# Patient Record
Sex: FEMALE | Race: ASIAN | NOT HISPANIC OR LATINO | URBAN - METROPOLITAN AREA
[De-identification: names, ages, dates, MRNs, and addresses within clinical notes are randomized per-mention and may not be internally consistent; named-entity substitution may affect disease eponyms.]

---

## 2023-01-26 ENCOUNTER — EMERGENCY (EMERGENCY)
Facility: HOSPITAL | Age: 58
LOS: 1 days | Discharge: HOME | End: 2023-01-26
Attending: EMERGENCY MEDICINE
Payer: MEDICAID

## 2023-01-26 VITALS
RESPIRATION RATE: 18 BRPM | SYSTOLIC BLOOD PRESSURE: 158 MMHG | HEIGHT: 62.99 IN | DIASTOLIC BLOOD PRESSURE: 90 MMHG | WEIGHT: 116.84 LBS | HEART RATE: 100 BPM | TEMPERATURE: 98 F | OXYGEN SATURATION: 97 %

## 2023-01-26 PROCEDURE — 99284 EMERGENCY DEPT VISIT MOD MDM: CPT

## 2023-01-26 NOTE — ED ADULT NURSE NOTE - NSIMPLEMENTINTERV_GEN_ALL_ED
Implemented All Fall Risk Interventions:  [place] to call system. Call bell, personal items and telephone within reach. Instruct patient to call for assistance. Room bathroom lighting operational. Non-slip footwear when patient is off stretcher. Physically safe environment: no spills, clutter or unnecessary equipment. Stretcher in lowest position, wheels locked, appropriate side rails in place. Provide visual cue, wrist band, yellow gown, etc. Monitor gait and stability. Monitor for mental status changes and reorient to person, place, and time. Review medications for side effects contributing to fall risk. Reinforce activity limits and safety measures with patient and family.

## 2023-01-26 NOTE — ED ADULT NURSE NOTE - OBJECTIVE STATEMENT
Pt is a 58 y/o female presenting to the ED c/o hearing loss. Pt is primarily Slovak-speaking, family at bedside requesting to translate. Pt endorses acute onset of hearing loss in L ear today after waking up with associated dizziness while ambulating. No significant PMH. Pt denies falls/trauma, headache, changes in vision, chest pain, SOB, weakness.

## 2023-01-26 NOTE — ED ADULT TRIAGE NOTE - CHIEF COMPLAINT QUOTE
patient states that she has not been able to hear out of her left ear since 8 am when she woke up, states that "I think my pillow is the cause"

## 2023-01-26 NOTE — ED ADULT NURSE NOTE - CHPI ED NUR SYMPTOMS NEG
no bleeding gums/no chills/no fever/no loss of consciousness/no nausea/no numbness/no syncope/no weakness

## 2023-01-27 ENCOUNTER — APPOINTMENT (OUTPATIENT)
Dept: OTOLARYNGOLOGY | Facility: CLINIC | Age: 58
End: 2023-01-27
Payer: MEDICAID

## 2023-01-27 VITALS
DIASTOLIC BLOOD PRESSURE: 86 MMHG | OXYGEN SATURATION: 99 % | SYSTOLIC BLOOD PRESSURE: 135 MMHG | TEMPERATURE: 98 F | HEART RATE: 95 BPM | RESPIRATION RATE: 18 BRPM

## 2023-01-27 DIAGNOSIS — Z82.49 FAMILY HISTORY OF ISCHEMIC HEART DISEASE AND OTHER DISEASES OF THE CIRCULATORY SYSTEM: ICD-10-CM

## 2023-01-27 DIAGNOSIS — Z78.9 OTHER SPECIFIED HEALTH STATUS: ICD-10-CM

## 2023-01-27 PROBLEM — Z00.00 ENCOUNTER FOR PREVENTIVE HEALTH EXAMINATION: Status: ACTIVE | Noted: 2023-01-27

## 2023-01-27 LAB
ALBUMIN SERPL ELPH-MCNC: 4.3 G/DL — SIGNIFICANT CHANGE UP (ref 3.3–5)
ALP SERPL-CCNC: 54 U/L — SIGNIFICANT CHANGE UP (ref 40–120)
ALT FLD-CCNC: 15 U/L — SIGNIFICANT CHANGE UP (ref 10–45)
ANION GAP SERPL CALC-SCNC: 11 MMOL/L — SIGNIFICANT CHANGE UP (ref 5–17)
AST SERPL-CCNC: 29 U/L — SIGNIFICANT CHANGE UP (ref 10–40)
BILIRUB SERPL-MCNC: 0.7 MG/DL — SIGNIFICANT CHANGE UP (ref 0.2–1.2)
BUN SERPL-MCNC: 9 MG/DL — SIGNIFICANT CHANGE UP (ref 7–23)
CALCIUM SERPL-MCNC: 9.7 MG/DL — SIGNIFICANT CHANGE UP (ref 8.4–10.5)
CHLORIDE SERPL-SCNC: 102 MMOL/L — SIGNIFICANT CHANGE UP (ref 96–108)
CO2 SERPL-SCNC: 25 MMOL/L — SIGNIFICANT CHANGE UP (ref 22–31)
CREAT SERPL-MCNC: 0.45 MG/DL — LOW (ref 0.5–1.3)
EGFR: 111 ML/MIN/1.73M2 — SIGNIFICANT CHANGE UP
GLUCOSE SERPL-MCNC: 110 MG/DL — HIGH (ref 70–99)
HCT VFR BLD CALC: 36.8 % — SIGNIFICANT CHANGE UP (ref 34.5–45)
HGB BLD-MCNC: 12.1 G/DL — SIGNIFICANT CHANGE UP (ref 11.5–15.5)
MCHC RBC-ENTMCNC: 30.9 PG — SIGNIFICANT CHANGE UP (ref 27–34)
MCHC RBC-ENTMCNC: 32.9 GM/DL — SIGNIFICANT CHANGE UP (ref 32–36)
MCV RBC AUTO: 93.9 FL — SIGNIFICANT CHANGE UP (ref 80–100)
NRBC # BLD: 0 /100 WBCS — SIGNIFICANT CHANGE UP (ref 0–0)
PLATELET # BLD AUTO: 256 K/UL — SIGNIFICANT CHANGE UP (ref 150–400)
POTASSIUM SERPL-MCNC: 4.3 MMOL/L — SIGNIFICANT CHANGE UP (ref 3.5–5.3)
POTASSIUM SERPL-SCNC: 4.3 MMOL/L — SIGNIFICANT CHANGE UP (ref 3.5–5.3)
PROT SERPL-MCNC: 7.3 G/DL — SIGNIFICANT CHANGE UP (ref 6–8.3)
RBC # BLD: 3.92 M/UL — SIGNIFICANT CHANGE UP (ref 3.8–5.2)
RBC # FLD: 14 % — SIGNIFICANT CHANGE UP (ref 10.3–14.5)
SODIUM SERPL-SCNC: 138 MMOL/L — SIGNIFICANT CHANGE UP (ref 135–145)
WBC # BLD: 6.11 K/UL — SIGNIFICANT CHANGE UP (ref 3.8–10.5)
WBC # FLD AUTO: 6.11 K/UL — SIGNIFICANT CHANGE UP (ref 3.8–10.5)

## 2023-01-27 PROCEDURE — 70450 CT HEAD/BRAIN W/O DYE: CPT | Mod: MA

## 2023-01-27 PROCEDURE — 70498 CT ANGIOGRAPHY NECK: CPT | Mod: 26,MA

## 2023-01-27 PROCEDURE — 92567 TYMPANOMETRY: CPT

## 2023-01-27 PROCEDURE — 80053 COMPREHEN METABOLIC PANEL: CPT

## 2023-01-27 PROCEDURE — 70498 CT ANGIOGRAPHY NECK: CPT | Mod: MA

## 2023-01-27 PROCEDURE — 93005 ELECTROCARDIOGRAM TRACING: CPT

## 2023-01-27 PROCEDURE — 99285 EMERGENCY DEPT VISIT HI MDM: CPT | Mod: 25

## 2023-01-27 PROCEDURE — 99204 OFFICE O/P NEW MOD 45 MIN: CPT | Mod: 25

## 2023-01-27 PROCEDURE — 36415 COLL VENOUS BLD VENIPUNCTURE: CPT

## 2023-01-27 PROCEDURE — 70496 CT ANGIOGRAPHY HEAD: CPT | Mod: MA

## 2023-01-27 PROCEDURE — 85027 COMPLETE CBC AUTOMATED: CPT

## 2023-01-27 PROCEDURE — 92557 COMPREHENSIVE HEARING TEST: CPT

## 2023-01-27 PROCEDURE — 69801 INCISE INNER EAR: CPT

## 2023-01-27 PROCEDURE — 70496 CT ANGIOGRAPHY HEAD: CPT | Mod: 26,MA

## 2023-01-27 NOTE — ED PROVIDER NOTE - PATIENT PORTAL LINK FT
You can access the FollowMyHealth Patient Portal offered by [facility] by registering at the following website: [email or web address]. By joining [employer]’s FollowMyHealth portal, you will also be able to view your health information using other applications (apps) compatible with our system.

## 2023-01-27 NOTE — ED PROVIDER NOTE - PHYSICAL EXAMINATION
GENERAL: AAOx4, GCS 15, NAD, WDWN   HEENT: MMM, no jugular venous distension, supple neck, PERRLA, EOMI, nonicteric sclera.  Clear TM and canals.    PULM: CTA B, no crackles/rubs/rales  CV: RRR, S1S2, no MRG  ABD: Flat abdomen, NTND, no R/G/R, no CVAT.    MSK: WILL, +2 pulses x4  NEURO: No obvious focal deficits -- CN 3-12 intact, 5/5 upper and lower strength, no sensory changes to extremities x 4, neg rhomberg, normal gait, f-->n intact  PSYCH: AAOx3, clear thought and normal sensorium

## 2023-01-27 NOTE — ED PROVIDER NOTE - NSFOLLOWUPCLINICS_GEN_ALL_ED_FT
[facility] - ENT  Otolaryngology (ENT)  [address] [place] [address]  Phone: [phone]  Fax:

## 2023-01-27 NOTE — PROCEDURE
[FreeTextEntry3] : Procedure note: Left intratympanic steroid injection.\par \par Description of Operative Procedure:  Risks, benefits, and alternatives of the plan and procedure were discussed with the patient prior to proceeding.  Risks would include but are not limited to bleeding, infection, persistent pain, persistent drainage, dizziness, or failure to improve hearing.  Benefits would include improvement in hearing loss.  The patient agreed to proceed.  Topical phenol was used to anesthetize the eardrum.  Using a long #27 gauge needle, 0.6 cc of dexamethasone 10 mg/ml was injected into the middle ear space.  The patient remained in the supine position for 15 minutes and tolerated the procedure without complications.

## 2023-01-27 NOTE — ED PROVIDER NOTE - OBJECTIVE STATEMENT
57 y F accompanied by daughter, Serbian speaking declining  when offered, prefers daughter to translate.  Significantly decreased hearing from L ear since this AM when she awoke from sleep.  Went to bed with hearing essentially intact.  States tried a new neck pillow that held her head extended, belives that she turned to her side and her head was flexed to the side (unsure R or L) for a long period of time.  No pain, no "whooshing", no tinnitus, no neck pain, no n/v, no vision changes.  Does endorse some lightheadedness that is wose when she is walking/upright, not present when speaking to me on the stretcher but does endores some upon ambulation in the ED.  Has not inserted anything into her canal.  No f/c.  No R sided symptoms.  No HA.  No trauma.

## 2023-01-27 NOTE — ASSESSMENT
[FreeTextEntry1] : The pathophysiology, potential etiologies, and treatment of sudden sensorineural hearing loss were discussed with the patient.  MRI was recommended due to the higher incidence of vestibular schwannoma in patients with sudden sensorineural hearing loss.  Treatment options including high dose oral steroids and intratympanic steroid injections were discussed.   Potential side effects of high dose prednisone were discussed, including but not limited to mood changes, irritability, insomnia, stomach upset, gastritis, and bone necrosis of the hip or shoulder.  Rx high-dose prednisone, IT steroid injection also performed today without complication, follow-up 1 week for reassessment.

## 2023-01-27 NOTE — REASON FOR VISIT
[Initial Evaluation] : an initial evaluation for [Spouse] : spouse [FreeTextEntry2] : sudden hearing loss

## 2023-01-27 NOTE — ED PROVIDER NOTE - NSFOLLOWUPINSTRUCTIONS_ED_ALL_ED_FT
Hearing Loss    WHAT YOU NEED TO KNOW:    What is hearing loss? Hearing loss means you have trouble hearing or you cannot hear at all in one or both ears. Hearing loss can happen suddenly or slowly over time.    What are the types of hearing loss?   •Conductive hearing loss occurs when there is a problem with the outer or middle ear. Sound waves cannot reach your inner ear. This type of hearing loss may be caused by earwax buildup, fluid, or a punctured ear drum. It can often be treated by correcting the cause of the problem.      •Sensorineural hearing loss is caused by damage to parts of the inner ear. There is usually no cure for sensorineural hearing loss.      •Mixed hearing loss includes both conductive and sensorineural hearing loss.    Ear Anatomy         What causes hearing loss?   •Aging      •Regular exposure to loud noise      •Head injury      •Blockage in your ear caused by earwax buildup, swelling, cyst, or other growth      •Medical conditions such as ear infections or otosclerosis (abnormal growth of bones in the ear)      •Medicines that damage your ears such as aspirin, certain antibiotics, and diuretics      What are the signs and symptoms that you may have hearing loss?   •You often ask others to repeat what they just said. You may think people are mumbling or not speaking clearly. Family members ask you if your hearing is okay.      •You cup your hand behind one of your ears when you listen.      •You need to have the radio or television louder than usual.      •You need to lean forward or turn your head to be able to hear.      •You have ringing or buzzing in your ears, or you are dizzy.      •You avoid certain situations because you have a hard time hearing.      How is hearing loss diagnosed? Your healthcare provider will ask about your hearing loss and examine your ears. You may need any of the following:  •Hearing tests may be done to check how well you hear whispered words or soft sounds such as a finger rub.      •A tuning fork may be used to test your hearing. A tuning fork is made of metal. It vibrates and makes noise when it strikes an object. Your healthcare provider will hold the tuning fork to the left and right of your head. Your provider will ask if you can hear the noise and feel the vibration in each ear.      •Audiometry is a test used to measure how well you can hear different sounds. You will put on headphones that are attached to a machine. Sounds will be sent through the headphones. You will press a button or raise your hand when you hear the sounds. Each ear will be tested separately. Another device will be placed on the bone behind your ear. The device will test how well vibration moves through the bones. This is called bone conduction.      •Tympanometry is a test used to find hearing problems in the middle ear. A device is placed into your ear. The device creates pressure changes that make your eardrum vibrate.      How is hearing loss treated? Treatment depends on the cause of your hearing loss. Removal of earwax or treatment for any medical conditions that have caused your hearing loss may be needed. You may need any of the following:  •A hearing aid is a small device that fits inside your ear and helps you hear better. Your healthcare provider can help you choose a hearing aid that is right for you.  Types of Hearing Aids           •A cochlear implant is a tiny device that is put into your cochlea (part of your inner ear) during surgery. This device can only be used in people with sensorineural hearing loss.      •Assistive listening devices (ALDs)  sound and send it through earphones or a headset. ALDs can help you hear better when you are in a place with background noise. Examples include theaters, classrooms, or auditoriums. ALDs are also available for phones. ALDs can be used alone or with hearing aids or cochlear implants.      •Surgery may be needed if your hearing loss is caused by otosclerosis. Surgery may also be done to place small tubes in your ear. These tubes help drain fluid and help prevent ear infections.      How can I manage my hearing loss?   •Protect your hearing. Use ear plugs or ear protectors if you do activities that are very loud. These include using a lawnmower and power tools or going to a concert that has loud music. Use well-fitting foam earplugs that completely block your ear canal. Do not listen to loud music through headphones or earphones.      •Tell people that you have hearing loss. Ask people to face you directly when they speak to you, and to slow down if they are speaking too fast. When you are in a group setting, sit in a location where you can clearly see the faces of the people who are speaking. Ask people not to speak loudly or shout when they are speaking to you. Try to talk with others in a quiet place. Background noise makes it harder for you to hear.      •Pay close attention to your surroundings when you drive. Do not talk to people in your car while you are driving. Watch for problems on the road or approaching emergency vehicles.      When should I seek immediate care?   •You have fluid, pus, or blood leaking from your ear.      •You have sudden, severe hearing loss.      When should I call my doctor or audiologist?   •You have a fever.      •You have ear pain that is getting worse.      •You have ringing in your ears or dizziness that will not go away.      •You have questions or concerns about your condition or care.      CARE AGREEMENT:    You have the right to help plan your care. Learn about your health condition and how it may be treated. Discuss treatment options with your healthcare providers to decide what care you want to receive. You always have the right to refuse treatment.

## 2023-01-27 NOTE — HISTORY OF PRESENT ILLNESS
[de-identified] : 57 year female presents for sudden hearing loss.\par States symptoms started happening yesterday morning - with no prior events. \par Had one episode of dizziness after hearing loss episode. \par Patient denies otalgia, otorrhea, ear infections, tinnitus, vertigo, headaches related to hearing.

## 2023-01-27 NOTE — ED PROVIDER NOTE - CLINICAL SUMMARY MEDICAL DECISION MAKING FREE TEXT BOX
L hearing loss, last normal approx 24 hour prior to arrival in ED, no pain but +dizziness/?lightheadedness? No evidence of trauma or impaction, no e/o AOM or AOE.  Doubt cervical/carotid dissection but possible given dizziness.  No real c/f CVA.  Will check labs, CT head/CT-A H&N, reassess.  --BMM

## 2023-01-27 NOTE — ED PROVIDER NOTE - PROGRESS NOTE DETAILS
Attending Masom:  pt signed out to me by Dr. Jiménez, decreased hearing in left ear, cta head/neck r/o dissection, basic labs, imaging neg, labwork unremarkable. Attending Kayla:  evaluated pt, discussed lab/CT results, fu w/ ent oupt, amenable to dc

## 2023-01-31 ENCOUNTER — NON-APPOINTMENT (OUTPATIENT)
Age: 58
End: 2023-01-31

## 2023-02-02 ENCOUNTER — APPOINTMENT (OUTPATIENT)
Dept: MRI IMAGING | Facility: IMAGING CENTER | Age: 58
End: 2023-02-02
Payer: MEDICAID

## 2023-02-02 ENCOUNTER — OUTPATIENT (OUTPATIENT)
Dept: OUTPATIENT SERVICES | Facility: HOSPITAL | Age: 58
LOS: 1 days | End: 2023-02-02
Payer: MEDICAID

## 2023-02-02 DIAGNOSIS — Z00.8 ENCOUNTER FOR OTHER GENERAL EXAMINATION: ICD-10-CM

## 2023-02-02 PROCEDURE — A9585: CPT

## 2023-02-02 PROCEDURE — 70553 MRI BRAIN STEM W/O & W/DYE: CPT

## 2023-02-02 PROCEDURE — 70553 MRI BRAIN STEM W/O & W/DYE: CPT | Mod: 26

## 2023-02-03 ENCOUNTER — APPOINTMENT (OUTPATIENT)
Dept: OTOLARYNGOLOGY | Facility: CLINIC | Age: 58
End: 2023-02-03
Payer: MEDICAID

## 2023-02-03 VITALS — HEART RATE: 73 BPM | DIASTOLIC BLOOD PRESSURE: 90 MMHG | SYSTOLIC BLOOD PRESSURE: 138 MMHG

## 2023-02-03 PROCEDURE — 92567 TYMPANOMETRY: CPT

## 2023-02-03 PROCEDURE — 99214 OFFICE O/P EST MOD 30 MIN: CPT | Mod: 25

## 2023-02-03 PROCEDURE — 69801 INCISE INNER EAR: CPT

## 2023-02-03 PROCEDURE — 92557 COMPREHENSIVE HEARING TEST: CPT

## 2023-02-03 RX ORDER — PREDNISONE 10 MG/1
10 TABLET ORAL
Qty: 42 | Refills: 0 | Status: COMPLETED | COMMUNITY
Start: 2023-01-27 | End: 2023-02-03

## 2023-02-03 NOTE — REASON FOR VISIT
[Subsequent Evaluation] : a subsequent evaluation for [Spouse] : spouse [FreeTextEntry2] : sudden hearing loss

## 2023-02-03 NOTE — ASSESSMENT
[FreeTextEntry1] : Patient reports no significant improvement in hearing loss in left ear since last visit.  Did note new onset dizziness that began proximally 1 day after injection and coincided with start of oral steroids.  Discontinue oral steroids.  Still feeling off balance.  Otoscopic exam today shows intact left tympanic membrane injection site, otherwise no change.  I personally ordered and reviewed an audiogram for her hearing loss, which shows a persistent left profound hearing loss.  I personally reviewed and interpreted prior MRI images and the report, which shows no IAC or CPA pathology.\par \par Because of patient's inability to tolerate oral steroids, I did offer a second IT steroid injection today, and she agreed to proceed.  Rx diazepam and Zofran as needed dizziness, also provided referral to vestibular therapy.  Also discussed hyperbaric oxygen therapy as an option and provided referral for this.  Lastly, patient complaining of persistent pain after injection, Rx Floxin drops for 2 days.  Follow-up 1 week.

## 2023-02-03 NOTE — HISTORY OF PRESENT ILLNESS
[de-identified] : 57 year female presents for follow up for sudden hearing loss. \par Was prescribed prednisone - reported increased feelings of dizziness - stopped taking medications. \par  states hearing same since last visit.\par Reports left ear tinnitus/buzzing from 1/28. Denies pulsating sensations. Exacerbated with loud noises. \par MRI Brain and Internal Auditory Canals done 2/2/23. Results pending.

## 2023-02-03 NOTE — PROCEDURE
[FreeTextEntry3] : Procedure note: Left intratympanic steroid injection.\par \par Description of Operative Procedure:  Risks, benefits, and alternatives of the plan and procedure were discussed with the patient prior to proceeding.  Risks would include but are not limited to bleeding, infection, persistent pain, persistent drainage, dizziness, or failure to improve hearing.  Benefits would include improvement in hearing loss.  The patient agreed to proceed.  Topical phenol was used to anesthetize the eardrum.  Using a long #25 gauge needle, 0.6 cc of dexamethasone 10 mg/ml was injected into the middle ear space.  The patient remained in the supine position for 15 minutes and tolerated the procedure without complications.

## 2023-02-10 ENCOUNTER — APPOINTMENT (OUTPATIENT)
Dept: OTOLARYNGOLOGY | Facility: CLINIC | Age: 58
End: 2023-02-10
Payer: MEDICAID

## 2023-02-10 PROCEDURE — 99213 OFFICE O/P EST LOW 20 MIN: CPT

## 2023-02-10 PROCEDURE — 92557 COMPREHENSIVE HEARING TEST: CPT

## 2023-02-10 PROCEDURE — 92567 TYMPANOMETRY: CPT

## 2023-02-10 NOTE — HISTORY OF PRESENT ILLNESS
[de-identified] : 57 year female presents for follow up for sudden hearing loss. \par s/p steroid injection x2 \par reports dizziness has remained the same since last visit\par denies subjective change in hearing since last visit \par Reports left ear tinnitus/buzzing from 1/28. Denies pulsating sensations. Exacerbated with loud noises. \par \par MRI Brain and Internal Auditory Canals done 2/2/23.\par IMPRESSION\par Unremarkable examination of the internal auditory canals.\par \par \par \par

## 2023-02-10 NOTE — ASSESSMENT
[FreeTextEntry1] : Patient presents for follow-up for left sudden hearing loss.  No subjective hearing improvement since last visit.  Still with dizziness, has not yet started VRT.  Planning to start hyperbaric oxygen next week.  Otoscopic exam today shows healed left ear injection site.  I personally ordered and reviewed an audiogram for hearing loss, which shows persistent left profound hearing loss.\par \par Recommended starting vestibular therapy, patient plans to start hyperbaric oxygen therapy next week.  Recommended rechecking audiogram in 6 weeks after completion of HBO.  Discussed options for management of single-sided deafness if hearing does not improve, including CROS device versus cochlear implant.  Discussed how each option differs from the other.

## 2023-03-22 ENCOUNTER — APPOINTMENT (OUTPATIENT)
Dept: OTOLARYNGOLOGY | Facility: CLINIC | Age: 58
End: 2023-03-22
Payer: MEDICAID

## 2023-03-22 VITALS
HEIGHT: 62 IN | DIASTOLIC BLOOD PRESSURE: 81 MMHG | WEIGHT: 116 LBS | HEART RATE: 87 BPM | SYSTOLIC BLOOD PRESSURE: 116 MMHG | BODY MASS INDEX: 21.35 KG/M2

## 2023-03-22 PROCEDURE — 99213 OFFICE O/P EST LOW 20 MIN: CPT | Mod: 25

## 2023-03-22 PROCEDURE — 92557 COMPREHENSIVE HEARING TEST: CPT

## 2023-03-22 PROCEDURE — 92504 EAR MICROSCOPY EXAMINATION: CPT

## 2023-03-22 PROCEDURE — 92567 TYMPANOMETRY: CPT

## 2023-03-22 RX ORDER — ESTROGENS, CONJUGATED 0.45 MG/1
TABLET, FILM COATED ORAL
Refills: 0 | Status: ACTIVE | COMMUNITY

## 2023-03-22 RX ORDER — ONDANSETRON 4 MG/1
4 TABLET, ORALLY DISINTEGRATING ORAL
Qty: 30 | Refills: 0 | Status: COMPLETED | COMMUNITY
Start: 2023-02-03 | End: 2023-03-22

## 2023-03-22 RX ORDER — OFLOXACIN OTIC 3 MG/ML
0.3 SOLUTION AURICULAR (OTIC) TWICE DAILY
Qty: 6 | Refills: 1 | Status: COMPLETED | COMMUNITY
Start: 2023-02-03 | End: 2023-03-22

## 2023-03-22 NOTE — ASSESSMENT
[FreeTextEntry1] : Patient presents for follow-up for left sudden hearing loss.  Completed 10 sessions of hyperbaric oxygen, 1 hour apiece.  Reports some subjective improvement in sound awareness.  Dizziness resolved.  Exam today shows intact left tympanic membrane with no evidence of residual perforation anywhere involving eardrum, no effusion.  Right tympanic membrane intact without effusion or retraction.  I personally ordered and reviewed an audiogram for hearing loss, which shows improved sound awareness the left ear in the severe range, but still with absent speech discrimination.  Hearing in the right ear is normal.  Tympanometry is type C on the right, and large ear canal volume on the left side.\par \par I do not appreciate a perforation in the left ear today despite the abnormal tympanometry.  Recommend repeating tympanometry in 3 to 4 months to ensure normalization.  Discussed options for persistent left hearing loss, including CROS device versus cochlear implant.  She plans to try a CROS device and provided medical clearance for this.  Follow-up 3 to 4 months to recheck audiogram.

## 2023-03-22 NOTE — HISTORY OF PRESENT ILLNESS
[de-identified] : 58  year female presents for follow up for sudden hearing loss. \par s/p steroid injection x2 \par States she feels hearing is better.\par Reports dizziness has improved since the last visit. \par Did not Start Vestibular therapy as recommended. \par Reports intermittent left ear tinnitus/buzzing from 1/28. Denies pulsating sensations. Exacerbated with loud noises. \par Denies bleeding, otalgia, bleeding, otorrhea, fevers or recent ear infections. \par Audio completed 03/22/23 \par \par \par MRI Brain and Internal Auditory Canals done 2/2/23.\par IMPRESSION\par Unremarkable examination of the internal auditory canals.

## 2023-03-22 NOTE — REASON FOR VISIT
[Subsequent Evaluation] : a subsequent evaluation for [Spouse] : spouse [Patient Declined  Services] : - None: Patient declined  services [Source: ______] : History obtained from [unfilled] [FreeTextEntry2] : sudden hearing loss.  [TWNoteComboBox1] : Slovenian

## 2023-04-18 ENCOUNTER — APPOINTMENT (OUTPATIENT)
Dept: OTOLARYNGOLOGY | Facility: CLINIC | Age: 58
End: 2023-04-18

## 2023-11-16 NOTE — ED ADULT NURSE NOTE - EENT ASSESSMENT, MLM
----- Message from Trinidad Hernandez sent at 11/16/2023  1:45 PM CST -----  Contact: Neena Matute is calling in regards to getting an appt schedule.  Please call back at [phone]       Thanks         - - -

## 2024-01-04 ENCOUNTER — NON-APPOINTMENT (OUTPATIENT)
Age: 59
End: 2024-01-04

## 2024-01-05 ENCOUNTER — APPOINTMENT (OUTPATIENT)
Dept: OTOLARYNGOLOGY | Facility: CLINIC | Age: 59
End: 2024-01-05
Payer: MEDICAID

## 2024-01-05 VITALS
DIASTOLIC BLOOD PRESSURE: 90 MMHG | WEIGHT: 116 LBS | HEART RATE: 85 BPM | SYSTOLIC BLOOD PRESSURE: 138 MMHG | HEIGHT: 62 IN | BODY MASS INDEX: 21.35 KG/M2

## 2024-01-05 DIAGNOSIS — H93.292 OTHER ABNORMAL AUDITORY PERCEPTIONS, LEFT EAR: ICD-10-CM

## 2024-01-05 DIAGNOSIS — H91.92 UNSPECIFIED HEARING LOSS, LEFT EAR: ICD-10-CM

## 2024-01-05 DIAGNOSIS — R42 DIZZINESS AND GIDDINESS: ICD-10-CM

## 2024-01-05 DIAGNOSIS — H93.12 TINNITUS, LEFT EAR: ICD-10-CM

## 2024-01-05 DIAGNOSIS — H91.22 SUDDEN IDIOPATHIC HEARING LOSS, LEFT EAR: ICD-10-CM

## 2024-01-05 PROCEDURE — 92504 EAR MICROSCOPY EXAMINATION: CPT

## 2024-01-05 PROCEDURE — 92557 COMPREHENSIVE HEARING TEST: CPT

## 2024-01-05 PROCEDURE — 92567 TYMPANOMETRY: CPT

## 2024-01-05 PROCEDURE — 99214 OFFICE O/P EST MOD 30 MIN: CPT | Mod: 25

## 2024-01-05 RX ORDER — DIAZEPAM 2 MG/1
2 TABLET ORAL
Qty: 30 | Refills: 0 | Status: COMPLETED | COMMUNITY
Start: 2023-02-03 | End: 2024-01-05

## 2024-01-05 RX ORDER — MECLIZINE HYDROCHLORIDE 25 MG/1
25 TABLET ORAL 3 TIMES DAILY
Qty: 90 | Refills: 3 | Status: ACTIVE | COMMUNITY
Start: 2024-01-05 | End: 1900-01-01

## 2024-01-05 NOTE — REASON FOR VISIT
[Subsequent Evaluation] : a subsequent evaluation for [Spouse] : spouse [FreeTextEntry2] : sudden hearing loss [FreeTextEntry3] : Patient declined offer of translation service. Patient preferred to use accompanying family member/friend for translation.  [TWNoteComboBox1] : Swedish

## 2024-01-05 NOTE — HISTORY OF PRESENT ILLNESS
[de-identified] : 58 year old female following up for sudden hearing loss.  s/p steroid injection x2 States hearing has not improved since last visit.  Continues to report left tinnitus- states tinnitus is constant and louder.  Reports vertigo episode 1 month ago.  Associated symptoms: spinning, dizziness- lasting about 3 days.

## 2024-01-05 NOTE — ASSESSMENT
[FreeTextEntry1] : Exam today shows intact tympanograms and effusion retraction, vital facial function normal.  Kathrine-Hallpike and supine roll maneuvers are negative for nystagmus.  I personally ordered and reviewed an audiogram for the patient's abnormal auditory perception, which shows normal hearing right ear, stable left severe sensorineural hearing loss.  Unclear if recent episode of vertigo related to post labyrinthitis BPV, or side effect of prior nights alcohol consumption.  Less likely diagnosis would be Meniere's disease given absence of hearing fluctuations and initial severity of hearing loss.  If vertigo recurs would consider oral prednisone taper and a trial of diuretic as well as vestibular testing.  Again discussed CI as an option but patient not ready to pursue this.  Monitor hearing annually.  New prescription for meclizine given as needed for any recurrent attacks of vertigo.